# Patient Record
Sex: FEMALE | Race: WHITE | NOT HISPANIC OR LATINO | Employment: STUDENT | URBAN - METROPOLITAN AREA
[De-identification: names, ages, dates, MRNs, and addresses within clinical notes are randomized per-mention and may not be internally consistent; named-entity substitution may affect disease eponyms.]

---
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
## 2023-06-29 NOTE — TELEPHONE ENCOUNTER
----- Message from Yossi DINORAH Frisard sent at 7/20/2017  1:55 PM CDT -----  Contact: Mom/Tye Gamble called in regarding the attached patient (dtr-Lorenzo). Tye stated that patient had appt on Monday 7/17/17 and was not informed of office location and patient missed appt.  Tye stated that no one had notified them of move and wants patient to be seen ASAP.    Tye would like a call back to discuss this at [phone]  
Rescheduled patient.  
no